# Patient Record
Sex: MALE | Race: WHITE
[De-identification: names, ages, dates, MRNs, and addresses within clinical notes are randomized per-mention and may not be internally consistent; named-entity substitution may affect disease eponyms.]

---

## 2021-01-05 ENCOUNTER — HOSPITAL ENCOUNTER (EMERGENCY)
Dept: HOSPITAL 62 - ER | Age: 51
Discharge: HOME | End: 2021-01-05
Payer: COMMERCIAL

## 2021-01-05 VITALS — DIASTOLIC BLOOD PRESSURE: 90 MMHG | SYSTOLIC BLOOD PRESSURE: 150 MMHG

## 2021-01-05 DIAGNOSIS — Z88.0: ICD-10-CM
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
## 2021-01-05 DIAGNOSIS — S09.90XA: Primary | ICD-10-CM

## 2021-01-05 DIAGNOSIS — Z79.82: ICD-10-CM

## 2021-01-05 PROCEDURE — 72125 CT NECK SPINE W/O DYE: CPT

## 2021-01-05 PROCEDURE — 70450 CT HEAD/BRAIN W/O DYE: CPT

## 2021-01-05 PROCEDURE — 99284 EMERGENCY DEPT VISIT MOD MDM: CPT

## 2021-01-05 NOTE — RADIOLOGY REPORT (SQ)
EXAM DESCRIPTION: 



CT HEAD WITHOUT IV CONTRAST



COMPLETED DATE/TME:  01/05/2021 20:45



CLINICAL HISTORY: 



50 years, Male, head injury, extended LOC EXAM DESCRIPTION: 



CT HEAD WITHOUT



CLINICAL HISTORY: 



head injury, extended LOC



COMPARISON: 



None Available



TECHNIQUE: 



Contiguous axial CT images of the head were obtained.



Coronal and sagittal reconstructions were created from the axial

data.



This exam was performed according to our departmental

dose-optimization program, which includes automated exposure

control, adjustment of the mA and/or kV according to patient size

and/or use of iterative reconstruction technique.



FINDINGS:  



There is no evidence of acute mass, mass effect, midline shift or

hemorrhage. The ventricles and extra-axial CSF spaces are

unremarkable. The brain parenchyma appears normal for the

patient's age. No acute abnormalities of the bones is seen.



IMPRESSION: 



No acute intracranial abnormality.

## 2021-01-05 NOTE — RADIOLOGY REPORT (SQ)
EXAM DESCRIPTION:

Site:  CT CERVICAL SPINE WITHOUT

RP: CT CERVICAL SPINE WITHOUT IV CONTRAST 



CLINICAL HISTORY: 

50 years  Male;  head injury, extended LOC;



TECHNIQUE: 

Noncontrast cervical spine CT with sagittal and coronal

reconstructions.

All CT scans at this facility use dose modulation, iterative

reconstruction, and/or weight based dosing when appropriate to

reduce radiation dose to as low as reasonably achievable.



COMPARISON:  None  



FINDINGS: 

Alignment is anatomic. 

C5-C6: Osteophytic ridging with moderate left foraminal stenosis.



There is no acute fracture of the cervical spine.  

No epidural hematoma. 



IMPRESSION:



1.  No acute cervical spine fracture or subluxation.

## 2021-01-05 NOTE — ER DOCUMENT REPORT
ED Head/Face/Scalp Injury





- General


Chief Complaint: Head Injury


Stated Complaint: FALL


Time Seen by Provider: 01/05/21 19:43


Mode of Arrival: Ambulatory


Information source: Patient, Relative - Wife





- HPI


Patient complains to provider of: Injury


Injury to: Head


Notes: 





Patient here with complaints of headache with head injury.  Patient states he 

was at work pulling something off of a shelf.  He states that the strap/band 

that was holding this object up broke causing him to lose his balance backwards.

 He believes he may have tripped over the [place] and hit his 

head.  He does not recall the event at all.  He was told that he was out for an 

hour.  He states that then his coworkers were able to get him up to a chair but 

he was in and out of it and confused for an hour after that.  EMS was not 

called.  He complains of a severe headache at this time.  He is not on blood 

thinners.  He does take a daily aspirin.  He denies any blurred or loss of 

vision.  He denies any numbness, tingling, weakness.  No chest pain or shortness

of breath.  No abdominal pain.  No nausea, vomiting, diarrhea.  Nothing makes 

his pain better or worse.  No other complaints at this time.





- Related Data


Allergies/Adverse Reactions: 


                                        





Penicillins Allergy (Verified 01/05/21 19:40)


   








Home Medications: LISINIPRIL.  ZOLOFT.  ASP





Past Medical History





- Social History


Smoking Status: Never Smoker


Frequency of alcohol use: Social


Drug Abuse: None


Family History: Reviewed & Not Pertinent





Review of Systems





- Review of Systems


-: Yes All other systems reviewed and negative





Physical Exam





- Notes


Notes: 





GENERAL: alert, cooperative, nontoxic, no distress.


HEAD: normocephalic, atraumatic


EYES: conjunctiva pink without discharge, no external redness or swelling. 

PERRL, EOM'S INTACT


EARS: no external swelling, no external redness.  No hemotympanum EM


NOSE: atraumatic, no external swelling.  No bleeding


MOUTH/THROAT: mucous membranes moist and pink, posterior pharynx without 

erythema, swelling, exudate. No trismus or drooling.


NECK: soft, supple, full range of motion, no meningismus.  No midline tenderness

step-offs or crepitus to palpation of the cervical spine.


CHEST: no distress, lungs clear and equal throughout.  No wheezing, rales, 

rhonchi.


CARDIAC: regular rate and rhythm, no murmur, normal capillary refill, normal 

pulses.


BACK: full range of motion, no CVA tenderness. No midline tenderness step-offs 

or crepitus to palpation of the thoracic or lumbar spine.


EXTREMITIES: full range of motion of all extremities.  No redness, no swelling.


NEURO: alert and oriented x 3, no focal deficits, full range of motion of all 

extremities. Cranial nerves II through XII are grossly intact.  Normal sensation

bilaterally.  Normal strength bilaterally.


PYSCH: appropriate mood, affect.  Patient is cooperative.


SKIN: pink, warm, dry, no rash.





Course





- Re-evaluation


Re-evalutation: 





01/05/21 21:32


Patient resting comfortably this time.  I have gone over the results with the 

patient and family.  Questions been answered.  We will discharge home.





Patient is nontoxic-appearing with stable vitals.  Here with complaints of head 

injury.  Patient states that he was at work pulling something off a shelf when 

he lost his balance falling backwards tripping over a forklift and hitting his 

head on the concrete.  He reports that he was unconscious for about an hour.  He

then states that it took about another hour for him to come completely to 

according to his workmates.  Does complain of a severe headache.  Patient has a 

nonfocal neuro exam.  He has no other signs of traumatic injury.  CT of cervical

spine and CT of the brain are negative for acute findings.  Patient will be 

discharged home with instructions take Tylenol Motrin as needed for pain.  Drink

plain fluids.  Ice to the sore area.  Follow-up for worsening pain, numbness, 

tingling, weakness, persistent vomiting or any further concerns.





The patient's emergency department workup and current diagnosis were explained 

to the patient and or family.  Follow-up instructions were provided.  

Medications if prescribed were discussed. Instructions for when to return to the

emergency department including specific  worrisome symptoms were discussed with 

the patient and/or family.





- Laboratory Results


Critical Laboratory Results Reviewed: No Critical Results





- Radiology Results


Critical Radiology Results Reviewed: No Critical Results





Discharge





- Discharge


Clinical Impression: 


Head injury


Qualifiers:


 Encounter type: initial encounter Qualified Code(s): S09.90XA - Unspecified 

injury of head, initial encounter





Condition: Stable


Disposition: HOME, SELF-CARE


Instructions:  Head Injury Precautions ([facility]), Neck Injury (Cervical Strain) 

([facility])


Additional Instructions: 


Take Tylenol Motrin as needed for pain.  Apply ice to the sore area.  Follow-up 

for worsening pain, severe headache, persistent vomiting, numbness, tingling, 

weakness, any further concerns.


Referrals: 


[facility] [Provider Group] - Follow up as needed